# Patient Record
Sex: FEMALE | Race: WHITE | Employment: FULL TIME
[De-identification: names, ages, dates, MRNs, and addresses within clinical notes are randomized per-mention and may not be internally consistent; named-entity substitution may affect disease eponyms.]

---

## 2023-11-14 ENCOUNTER — HOSPITAL ENCOUNTER (EMERGENCY)
Facility: HOSPITAL | Age: 38
Discharge: HOME | End: 2023-11-14
Attending: EMERGENCY MEDICINE
Payer: COMMERCIAL

## 2023-11-14 ENCOUNTER — ANCILLARY PROCEDURE (OUTPATIENT)
Facility: HOSPITAL | Age: 38
End: 2023-11-14
Attending: EMERGENCY MEDICINE
Payer: COMMERCIAL

## 2023-11-14 ENCOUNTER — OFFICE VISIT (OUTPATIENT)
Age: 38
End: 2023-11-14

## 2023-11-14 ENCOUNTER — APPOINTMENT (OUTPATIENT)
Facility: HOSPITAL | Age: 38
End: 2023-11-14
Payer: COMMERCIAL

## 2023-11-14 VITALS
DIASTOLIC BLOOD PRESSURE: 84 MMHG | OXYGEN SATURATION: 98 % | HEART RATE: 83 BPM | HEIGHT: 70 IN | TEMPERATURE: 98.4 F | RESPIRATION RATE: 22 BRPM | WEIGHT: 293 LBS | BODY MASS INDEX: 41.95 KG/M2 | SYSTOLIC BLOOD PRESSURE: 137 MMHG

## 2023-11-14 VITALS
BODY MASS INDEX: 41.95 KG/M2 | RESPIRATION RATE: 20 BRPM | HEART RATE: 81 BPM | TEMPERATURE: 97.6 F | DIASTOLIC BLOOD PRESSURE: 83 MMHG | OXYGEN SATURATION: 100 % | SYSTOLIC BLOOD PRESSURE: 169 MMHG | WEIGHT: 293 LBS | HEIGHT: 70 IN

## 2023-11-14 DIAGNOSIS — H53.9 TRANSIENT VISION DISTURBANCE OF LEFT EYE: Primary | ICD-10-CM

## 2023-11-14 DIAGNOSIS — J20.8 ACUTE VIRAL BRONCHITIS: ICD-10-CM

## 2023-11-14 DIAGNOSIS — J06.9 ACUTE UPPER RESPIRATORY INFECTION: Primary | ICD-10-CM

## 2023-11-14 DIAGNOSIS — H53.9 VISUAL DISTURBANCE: ICD-10-CM

## 2023-11-14 DIAGNOSIS — J40 BRONCHITIS: ICD-10-CM

## 2023-11-14 DIAGNOSIS — R09.89 SYMPTOMS OF UPPER RESPIRATORY INFECTION (URI): ICD-10-CM

## 2023-11-14 DIAGNOSIS — H53.50 VISUAL COLOR CHANGES: ICD-10-CM

## 2023-11-14 DIAGNOSIS — J05.0 CROUPY COUGH: ICD-10-CM

## 2023-11-14 LAB
INFLUENZA A ANTIGEN, POC: NEGATIVE
INFLUENZA B ANTIGEN, POC: NEGATIVE
Lab: NORMAL
PERFORMING INSTRUMENT: NORMAL
QC PASS/FAIL: NORMAL
SARS-COV-2, POC: NORMAL
VALID INTERNAL CONTROL, POC: YES

## 2023-11-14 PROCEDURE — 71046 X-RAY EXAM CHEST 2 VIEWS: CPT

## 2023-11-14 PROCEDURE — 99284 EMERGENCY DEPT VISIT MOD MDM: CPT

## 2023-11-14 RX ORDER — PREDNISONE 20 MG/1
50 TABLET ORAL DAILY
Qty: 13 TABLET | Refills: 0 | Status: SHIPPED | OUTPATIENT
Start: 2023-11-14 | End: 2023-11-19

## 2023-11-14 RX ORDER — BENZONATATE 200 MG/1
200 CAPSULE ORAL 3 TIMES DAILY PRN
Qty: 21 CAPSULE | Refills: 0 | Status: SHIPPED | OUTPATIENT
Start: 2023-11-14 | End: 2023-11-21

## 2023-11-14 ASSESSMENT — PAIN DESCRIPTION - ORIENTATION: ORIENTATION: MID

## 2023-11-14 ASSESSMENT — PAIN SCALES - GENERAL: PAINLEVEL_OUTOF10: 3

## 2023-11-14 ASSESSMENT — LIFESTYLE VARIABLES: HOW OFTEN DO YOU HAVE A DRINK CONTAINING ALCOHOL: PATIENT DECLINED

## 2023-11-14 ASSESSMENT — PAIN DESCRIPTION - DESCRIPTORS: DESCRIPTORS: ACHING

## 2023-11-14 ASSESSMENT — PAIN DESCRIPTION - LOCATION: LOCATION: HEAD

## 2023-11-14 NOTE — PROGRESS NOTES
Linette Martin (:  1985) is a 45 y.o. female,New patient, here for evaluation of the following chief complaint(s):  Cough      ASSESSMENT/PLAN:  Visit Diagnoses and Associated Orders       Transient vision disturbance of left eye    -  Primary         Visual color changes             Croupy cough        POCT COVID-19, Antigen [UIV373 Custom]      AMB POC INFLUENZA A  AND B REAL-TIME RT-PCR [78184 CPT(R)]      predniSONE (DELTASONE) 20 MG tablet [6496]      benzonatate (TESSALON) 200 MG capsule [41394]           Acute viral bronchitis        predniSONE (DELTASONE) 20 MG tablet [6496]      benzonatate (TESSALON) 200 MG capsule [82808]                      Referred to [facility] ED for further evaluation of visual changes - concern for retinopahty. Patient is in agreement with the treatment plan, will be seen at [facility] ED now. Rapid COVID-19 and influenza negative. VSS, afebrile, lungs CTAB. To treat for acute viral bronchitis with croupy cough with prednisone 50 mg once daily x 5 days and benzonatate 200 mg TID PRN for cough. May use cough drops, nasal saline, cool mist humidifier, rest, increased fluid intake for symptomatic relief. To monitor symptoms and follow-up if symptoms worsen or do not improve on current treatment plan. To ED for severe CP, chest tightness, SOB, rapid worsening of symptoms. Patient verbalized understanding, no questions or concerns at this time . Follow up in PRN days if symptoms persist or if symptoms worsen. SUBJECTIVE/OBJECTIVE:  HPI  HPI:   45 y.o. female presents with symptoms of loud barking dry cough, intermittent HA that is worse with coughing x 10 days and a visual disturbance to her L eye x 1 hour. Reports on her way driving her she developed \"a prism or kaledoscope visual disturbance of her L eye\" that obstructed her vision while driving and lasted for 45 min to 1 hour.  She continues to have this visual disturbance which lasts only a few minutes with

## 2023-11-14 NOTE — PATIENT INSTRUCTIONS
Go to [facility] ED now for evaluation of your visual changes. Concern for retinopathy. Please check with ER physician before starting prednisone and benzonatate - they may change your treatment plan while you are there.

## 2023-11-15 NOTE — ED TRIAGE NOTES
Patient presents ambulatory to treatment area with a steady gait. Patient states she went to urgent care this evening for her cough, but was told to report to the ED due to vision changes in her bilateral eyes today. Patient states she began with \"kaleidoscope vision\" in her left eye this evening. This has since resolved. Episode lasted for about 30 minutes. Patient does state she has a headache that worsens when she coughs. Denies current visual disturbance at this time, except when she coughs, at which time, she states she gets a \"black outlining of everything\".

## 2023-12-13 ENCOUNTER — OFFICE VISIT (OUTPATIENT)
Age: 38
End: 2023-12-13

## 2023-12-13 VITALS
WEIGHT: 293 LBS | TEMPERATURE: 97.9 F | DIASTOLIC BLOOD PRESSURE: 76 MMHG | BODY MASS INDEX: 41.02 KG/M2 | OXYGEN SATURATION: 96 % | HEIGHT: 71 IN | SYSTOLIC BLOOD PRESSURE: 119 MMHG | RESPIRATION RATE: 20 BRPM | HEART RATE: 74 BPM

## 2023-12-13 DIAGNOSIS — R05.1 ACUTE COUGH: Primary | ICD-10-CM

## 2023-12-13 RX ORDER — BENZONATATE 200 MG/1
200 CAPSULE ORAL 3 TIMES DAILY PRN
COMMUNITY

## 2023-12-13 ASSESSMENT — ENCOUNTER SYMPTOMS
COUGH: 1
SHORTNESS OF BREATH: 0

## 2023-12-13 NOTE — PATIENT INSTRUCTIONS
Thank you for visiting [facility] today. For relief at home, you may use the following to help with your cough:  Throat lozenges, hot tea or honey  Minimize contact with irritants such as cigarette smoke  Zyrtec, Claritin, Allegra or Xyzal may provide relief  Ibuprofen/Tylenol for pain or muscle aches. Do not take ibuprofen if you have been prescribed prednisone.   Vicks VapoRub on the soles of feet with socks at night time  Saline nasal spray 8-10 times daily  Increase humidification in your home, preferably cool mist  Cough medications as prescribed:  Robitussin DM  Flonase for congestion

## 2025-03-29 ENCOUNTER — OFFICE VISIT (OUTPATIENT)
Age: 40
End: 2025-03-29

## 2025-03-29 VITALS
HEIGHT: 70 IN | SYSTOLIC BLOOD PRESSURE: 121 MMHG | OXYGEN SATURATION: 96 % | DIASTOLIC BLOOD PRESSURE: 77 MMHG | HEART RATE: 72 BPM | WEIGHT: 293 LBS | BODY MASS INDEX: 41.95 KG/M2 | TEMPERATURE: 98.4 F | RESPIRATION RATE: 18 BRPM

## 2025-03-29 DIAGNOSIS — J98.8 WHEEZING-ASSOCIATED RESPIRATORY INFECTION (WARI): Primary | ICD-10-CM

## 2025-03-29 RX ORDER — DEXTROMETHORPHAN HYDROBROMIDE AND PROMETHAZINE HYDROCHLORIDE 15; 6.25 MG/5ML; MG/5ML
5 SYRUP ORAL 4 TIMES DAILY PRN
Qty: 118 ML | Refills: 0 | Status: SHIPPED | OUTPATIENT
Start: 2025-03-29 | End: 2025-04-05

## 2025-03-29 RX ORDER — BENZONATATE 100 MG/1
100 CAPSULE ORAL 3 TIMES DAILY PRN
Qty: 30 CAPSULE | Refills: 0 | Status: SHIPPED | OUTPATIENT
Start: 2025-03-29 | End: 2025-04-08

## 2025-03-29 RX ORDER — METHYLPREDNISOLONE 4 MG/1
4 TABLET ORAL SEE ADMIN INSTRUCTIONS
Qty: 1 KIT | Refills: 0 | Status: SHIPPED | OUTPATIENT
Start: 2025-03-29

## 2025-03-29 RX ORDER — GABAPENTIN 300 MG/1
300 CAPSULE ORAL 3 TIMES DAILY
COMMUNITY

## 2025-03-29 RX ORDER — ALBUTEROL SULFATE 90 UG/1
2 INHALANT RESPIRATORY (INHALATION) EVERY 6 HOURS PRN
Qty: 18 G | Refills: 0 | Status: SHIPPED | OUTPATIENT
Start: 2025-03-29

## 2025-03-29 RX ORDER — ALBUTEROL SULFATE 0.83 MG/ML
2.5 SOLUTION RESPIRATORY (INHALATION) 4 TIMES DAILY PRN
Qty: 120 EACH | Refills: 3 | Status: SHIPPED | OUTPATIENT
Start: 2025-03-29 | End: 2025-03-29 | Stop reason: CLARIF

## 2025-03-29 RX ORDER — ALBUTEROL SULFATE 0.83 MG/ML
2.5 SOLUTION RESPIRATORY (INHALATION) ONCE
Status: COMPLETED | OUTPATIENT
Start: 2025-03-29 | End: 2025-03-29

## 2025-03-29 RX ADMIN — ALBUTEROL SULFATE 2.5 MG: 0.83 SOLUTION RESPIRATORY (INHALATION) at 09:09

## 2025-03-29 ASSESSMENT — ENCOUNTER SYMPTOMS
RESPIRATORY NEGATIVE: 1
EYES NEGATIVE: 1
GASTROINTESTINAL NEGATIVE: 1
ALLERGIC/IMMUNOLOGIC NEGATIVE: 1

## 2025-03-29 NOTE — PROGRESS NOTES
follow-ups on file.  Follow up immediately for any new, worsening or changes or if symptoms are not improving over the next 5-7 days.     SUBJECTIVE/OBJECTIVE:    History provided by:  Patient   used: No           Cold Symptoms (Concern for allergies or something else going on. If I start speaking loudly I starts coughing. Sore throat. OTC Tylenol, allegra. Mucinex D, Started on Wednesday.)      No results found for any visits on 03/29/25.    No results found for this visit on 03/29/25.  XR Results (most recent):  @BSHSILASTIMGCAT([id number])@         Review of Systems   Constitutional: Negative.    HENT: Negative.     Eyes: Negative.    Respiratory: Negative.     Cardiovascular: Negative.    Gastrointestinal: Negative.    Endocrine: Negative.    Genitourinary: Negative.    Musculoskeletal: Negative.    Skin: Negative.    Allergic/Immunologic: Negative.    Neurological: Negative.    Hematological: Negative.    Psychiatric/Behavioral: Negative.           Physical Exam  Vitals and nursing note reviewed.   Constitutional:       General: She is not in acute distress.     Appearance: Normal appearance. She is not ill-appearing, toxic-appearing or diaphoretic.   HENT:      Head: Normocephalic and atraumatic.      Right Ear: Tympanic membrane, ear canal and external ear normal. There is no impacted cerumen.      Left Ear: Tympanic membrane, ear canal and external ear normal. There is no impacted cerumen.      Nose: Nose normal. No congestion or rhinorrhea.      Mouth/Throat:      Mouth: Mucous membranes are moist.      Pharynx: No oropharyngeal exudate or posterior oropharyngeal erythema.   Cardiovascular:      Rate and Rhythm: Normal rate and regular rhythm.   Pulmonary:      Effort: Pulmonary effort is normal. No respiratory distress.      Breath sounds: No stridor. Wheezing and rhonchi present. No rales.   Chest:      Chest wall: No tenderness.   Musculoskeletal:      Cervical back: Normal range of